# Patient Record
Sex: MALE | Race: WHITE | NOT HISPANIC OR LATINO | URBAN - METROPOLITAN AREA
[De-identification: names, ages, dates, MRNs, and addresses within clinical notes are randomized per-mention and may not be internally consistent; named-entity substitution may affect disease eponyms.]

---

## 2018-05-17 ENCOUNTER — INPATIENT (INPATIENT)
Facility: HOSPITAL | Age: 63
LOS: 0 days | Discharge: HOME | DRG: 247 | End: 2018-05-18
Attending: INTERNAL MEDICINE | Admitting: INTERNAL MEDICINE
Payer: COMMERCIAL

## 2018-05-17 VITALS
RESPIRATION RATE: 18 BRPM | WEIGHT: 227.08 LBS | HEART RATE: 78 BPM | SYSTOLIC BLOOD PRESSURE: 105 MMHG | HEIGHT: 67 IN | DIASTOLIC BLOOD PRESSURE: 64 MMHG | OXYGEN SATURATION: 96 % | TEMPERATURE: 98 F

## 2018-05-17 DIAGNOSIS — R93.1 ABNORMAL FINDINGS ON DIAGNOSTIC IMAGING OF HEART AND CORONARY CIRCULATION: ICD-10-CM

## 2018-05-17 DIAGNOSIS — Z98.890 OTHER SPECIFIED POSTPROCEDURAL STATES: Chronic | ICD-10-CM

## 2018-05-17 LAB
ALBUMIN SERPL ELPH-MCNC: 4.6 G/DL — SIGNIFICANT CHANGE UP (ref 3.3–5)
ALP SERPL-CCNC: 58 U/L — SIGNIFICANT CHANGE UP (ref 40–120)
ALT FLD-CCNC: 40 U/L — SIGNIFICANT CHANGE UP (ref 10–45)
ANION GAP SERPL CALC-SCNC: 14 MMOL/L — SIGNIFICANT CHANGE UP (ref 5–17)
AST SERPL-CCNC: 24 U/L — SIGNIFICANT CHANGE UP (ref 10–40)
BILIRUB SERPL-MCNC: 0.4 MG/DL — SIGNIFICANT CHANGE UP (ref 0.2–1.2)
BUN SERPL-MCNC: 16 MG/DL — SIGNIFICANT CHANGE UP (ref 7–23)
CALCIUM SERPL-MCNC: 9.1 MG/DL — SIGNIFICANT CHANGE UP (ref 8.4–10.5)
CHLORIDE SERPL-SCNC: 104 MMOL/L — SIGNIFICANT CHANGE UP (ref 96–108)
CO2 SERPL-SCNC: 25 MMOL/L — SIGNIFICANT CHANGE UP (ref 22–31)
CREAT SERPL-MCNC: 0.89 MG/DL — SIGNIFICANT CHANGE UP (ref 0.5–1.3)
GLUCOSE SERPL-MCNC: 95 MG/DL — SIGNIFICANT CHANGE UP (ref 70–99)
HCT VFR BLD CALC: 48.8 % — SIGNIFICANT CHANGE UP (ref 39–50)
HGB BLD-MCNC: 17.1 G/DL — HIGH (ref 13–17)
MCHC RBC-ENTMCNC: 31.2 PG — SIGNIFICANT CHANGE UP (ref 27–34)
MCHC RBC-ENTMCNC: 35.1 GM/DL — SIGNIFICANT CHANGE UP (ref 32–36)
MCV RBC AUTO: 88.9 FL — SIGNIFICANT CHANGE UP (ref 80–100)
PLATELET # BLD AUTO: 157 K/UL — SIGNIFICANT CHANGE UP (ref 150–400)
POTASSIUM SERPL-MCNC: 4.2 MMOL/L — SIGNIFICANT CHANGE UP (ref 3.5–5.3)
POTASSIUM SERPL-SCNC: 4.2 MMOL/L — SIGNIFICANT CHANGE UP (ref 3.5–5.3)
PROT SERPL-MCNC: 7.2 G/DL — SIGNIFICANT CHANGE UP (ref 6–8.3)
RBC # BLD: 5.49 M/UL — SIGNIFICANT CHANGE UP (ref 4.2–5.8)
RBC # FLD: 12.6 % — SIGNIFICANT CHANGE UP (ref 10.3–14.5)
SODIUM SERPL-SCNC: 143 MMOL/L — SIGNIFICANT CHANGE UP (ref 135–145)
WBC # BLD: 7.5 K/UL — SIGNIFICANT CHANGE UP (ref 3.8–10.5)
WBC # FLD AUTO: 7.5 K/UL — SIGNIFICANT CHANGE UP (ref 3.8–10.5)

## 2018-05-17 PROCEDURE — 99152 MOD SED SAME PHYS/QHP 5/>YRS: CPT

## 2018-05-17 PROCEDURE — 92928 PRQ TCAT PLMT NTRAC ST 1 LES: CPT | Mod: RC

## 2018-05-17 PROCEDURE — 93454 CORONARY ARTERY ANGIO S&I: CPT | Mod: 26,59

## 2018-05-17 PROCEDURE — 93010 ELECTROCARDIOGRAM REPORT: CPT | Mod: 76

## 2018-05-17 RX ORDER — ATORVASTATIN CALCIUM 80 MG/1
40 TABLET, FILM COATED ORAL AT BEDTIME
Qty: 0 | Refills: 0 | Status: DISCONTINUED | OUTPATIENT
Start: 2018-05-17 | End: 2018-05-17

## 2018-05-17 RX ORDER — CLOPIDOGREL BISULFATE 75 MG/1
75 TABLET, FILM COATED ORAL DAILY
Qty: 0 | Refills: 0 | Status: DISCONTINUED | OUTPATIENT
Start: 2018-05-18 | End: 2018-05-18

## 2018-05-17 RX ORDER — ASPIRIN/CALCIUM CARB/MAGNESIUM 324 MG
1 TABLET ORAL
Qty: 0 | Refills: 0 | COMMUNITY
Start: 2018-05-17

## 2018-05-17 RX ORDER — ATORVASTATIN CALCIUM 80 MG/1
40 TABLET, FILM COATED ORAL AT BEDTIME
Qty: 0 | Refills: 0 | Status: DISCONTINUED | OUTPATIENT
Start: 2018-05-17 | End: 2018-05-18

## 2018-05-17 RX ORDER — AMLODIPINE BESYLATE 2.5 MG/1
5 TABLET ORAL DAILY
Qty: 0 | Refills: 0 | Status: DISCONTINUED | OUTPATIENT
Start: 2018-05-17 | End: 2018-05-18

## 2018-05-17 RX ORDER — SODIUM CHLORIDE 9 MG/ML
3 INJECTION INTRAMUSCULAR; INTRAVENOUS; SUBCUTANEOUS EVERY 8 HOURS
Qty: 0 | Refills: 0 | Status: DISCONTINUED | OUTPATIENT
Start: 2018-05-17 | End: 2018-05-18

## 2018-05-17 RX ORDER — ATORVASTATIN CALCIUM 80 MG/1
1 TABLET, FILM COATED ORAL
Qty: 30 | Refills: 0 | OUTPATIENT
Start: 2018-05-17 | End: 2018-06-15

## 2018-05-17 RX ORDER — ASPIRIN/CALCIUM CARB/MAGNESIUM 324 MG
81 TABLET ORAL DAILY
Qty: 0 | Refills: 0 | Status: DISCONTINUED | OUTPATIENT
Start: 2018-05-18 | End: 2018-05-18

## 2018-05-17 RX ORDER — AMLODIPINE BESYLATE 2.5 MG/1
1 TABLET ORAL
Qty: 0 | Refills: 0 | COMMUNITY

## 2018-05-17 RX ORDER — CLOPIDOGREL BISULFATE 75 MG/1
1 TABLET, FILM COATED ORAL
Qty: 30 | Refills: 11 | OUTPATIENT
Start: 2018-05-17 | End: 2019-05-11

## 2018-05-17 RX ADMIN — ATORVASTATIN CALCIUM 40 MILLIGRAM(S): 80 TABLET, FILM COATED ORAL at 21:29

## 2018-05-17 RX ADMIN — SODIUM CHLORIDE 3 MILLILITER(S): 9 INJECTION INTRAMUSCULAR; INTRAVENOUS; SUBCUTANEOUS at 17:03

## 2018-05-17 RX ADMIN — AMLODIPINE BESYLATE 5 MILLIGRAM(S): 2.5 TABLET ORAL at 17:23

## 2018-05-17 RX ADMIN — SODIUM CHLORIDE 3 MILLILITER(S): 9 INJECTION INTRAMUSCULAR; INTRAVENOUS; SUBCUTANEOUS at 21:29

## 2018-05-17 NOTE — DISCHARGE NOTE ADULT - CARE PROVIDER_API CALL
Zev Bryson (MD), Cardiovascular Disease  [address] [facility]  3rd [place] [address]  Phone: [phone]  Fax: [phone]

## 2018-05-17 NOTE — CHART NOTE - NSCHARTNOTEFT_GEN_A_CORE
discussed benefit/risks of ASA/Plavix  patient refusing statin therapy as mother and brother had significant memory loss while on statin (unable to find way home when driving)    patient advised to follow up with private cards Dr lopez to discuss other available options. Dr emery notified

## 2018-05-17 NOTE — DISCHARGE NOTE ADULT - CARE PLAN
Principal Discharge DX:	Coronary artery disease due to lipid rich plaque  Goal:	Pt remains chest pain free and understands post cath discharge instructions  Assessment and plan of treatment:	Do not MISS ANY DOSEs or STOP you Aspirin/Plavix, because these drugs helps to keep your sten open, unless instructed to do so by your cardiologist.   No heavy lifting or pushing/pulling with procedure arm for 2 weeks. No driving for 2 days. You may shower 24 hours following the procedure but avoid baths/swimming for 1 week. Check your wrist site for bleeding and/or swelling daily following procedure and call your doctor immediately if it occurs or if you experience increased pain at the site. Follow up with your cardiologist in 1-2 weeks. You may call [facility] if you have any questions/concerns regarding your procedure [phone].   Lifestyle modification: include healthy habits to prevent stroke and future CAD  Low salt, low fat diet.   Weight management.   Take medications as prescribed.    No smoking.  Follow up with your cardiologist or primary doctor in 1- 2 weeks.  Secondary Diagnosis:	Mixed hyperlipidemia  Goal:	Your LDL cholesterol will be less than 70mg/dL  Assessment and plan of treatment:	Continue with your cholesterol medications. Eat a heart healthy diet that is low in saturated fats and salt, and includes whole grains, fruits, vegetables and lean protein; exercise regularly (consult with your physician or cardiologist first); maintain a heart healthy weight; if you smoke - quit (A resource to help you stop smoking is the [facility] for Tobacco Control – phone number [phone].). Continue to follow with your primary physician or cardiologist.  Secondary Diagnosis:	Essential hypertension  Goal:	Your blood pressure will be controlled.  Assessment and plan of treatment:	Continue with your blood pressure medications; eat a heart healthy diet with low salt diet; exercise regularly (consult with your physician or cardiologist first); maintain a heart healthy weight; if you smoke - quit (A resource to help you stop smoking is the [facility] for [employer] – phone number [phone].); include healthy ways to manage stress. Continue to follow with your primary care physician or cardiologist.

## 2018-05-17 NOTE — DISCHARGE NOTE ADULT - MEDICATION SUMMARY - MEDICATIONS TO TAKE
I will START or STAY ON the medications listed below when I get home from the hospital:    aspirin 81 mg oral delayed release tablet  -- 1 tab(s) by mouth once a day  -- Indication: For Coronary artery disease due to lipid rich plaque    atorvastatin 40 mg oral tablet  -- 1 tab(s) by mouth once a day (at bedtime) MDD:1  -- Indication: For Mixed hyperlipidemia    clopidogrel 75 mg oral tablet  -- 1 tab(s) by mouth once a day MDD:1  -- Indication: For Coronary artery disease due to lipid rich plaque    amLODIPine 5 mg oral tablet  -- 1 tab(s) by mouth once a day  -- Indication: For HTN (hypertension)

## 2018-05-17 NOTE — CHART NOTE - NSCHARTNOTEFT_GEN_A_CORE
Patient underwent a PCI procedure and is being admitted as they are at increased risk for major adverse cardiac and vascular events if discharged due to the following high risk characteristics:  Uncontrolled HTN

## 2018-05-17 NOTE — H&P CARDIOLOGY - FAMILY HISTORY
Father  Still living? No  Family history of brain aneurysm, Age at diagnosis: Age Unknown     Mother  Still living? No  Family history of Alzheimer's disease, Age at diagnosis: Age Unknown

## 2018-05-17 NOTE — DISCHARGE NOTE ADULT - PATIENT PORTAL LINK FT
You can access the [facility] Portal, offered by [facility], by registering with the following website: [email or web address]

## 2018-05-17 NOTE — H&P CARDIOLOGY - HISTORY OF PRESENT ILLNESS
62  male pt with PMHx of HTN presents for cardiac cath. Pt reports she has been  feeling tired, evaluated by Dr. Mcgraw, had abnormal stress echogram today then sent for cath. pt denies chest pain or SOB. 62  male pt with PMHx of HTN presents for cardiac cath. Pt reports she has been  feeling tired, evaluated by Dr. Mcgraw, had abnormal stress echocardiogram today then sent for cath. pt denies chest pain or SOB. 62  male pt with PMHx of HTN presents for cardiac cath. Pt reports she has been  feeling tired, evaluated by Dr. Mcgraw, had abnormal stress echocardiogram today then sent for cath. pt denies chest pain or SOB.       Test results not available.

## 2018-05-17 NOTE — DISCHARGE NOTE ADULT - PLAN OF CARE
Pt remains chest pain free and understands post cath discharge instructions Do not MISS ANY DOSEs or STOP you Aspirin/Plavix, because these drugs helps to keep your sten open, unless instructed to do so by your cardiologist.   No heavy lifting or pushing/pulling with procedure arm for 2 weeks. No driving for 2 days. You may shower 24 hours following the procedure but avoid baths/swimming for 1 week. Check your wrist site for bleeding and/or swelling daily following procedure and call your doctor immediately if it occurs or if you experience increased pain at the site. Follow up with your cardiologist in 1-2 weeks. You may call [facility] if you have any questions/concerns regarding your procedure [phone].   Lifestyle modification: include healthy habits to prevent stroke and future CAD  Low salt, low fat diet.   Weight management.   Take medications as prescribed.    No smoking.  Follow up with your cardiologist or primary doctor in 1- 2 weeks. Your LDL cholesterol will be less than 70mg/dL Continue with your cholesterol medications. Eat a heart healthy diet that is low in saturated fats and salt, and includes whole grains, fruits, vegetables and lean protein; exercise regularly (consult with your physician or cardiologist first); maintain a heart healthy weight; if you smoke - quit (A resource to help you stop smoking is the [facility] for Tobacco Control – phone number [phone].). Continue to follow with your primary physician or cardiologist. Your blood pressure will be controlled. Continue with your blood pressure medications; eat a heart healthy diet with low salt diet; exercise regularly (consult with your physician or cardiologist first); maintain a heart healthy weight; if you smoke - quit (A resource to help you stop smoking is the [facility] for Tobacco Control – phone number [phone].); include healthy ways to manage stress. Continue to follow with your primary care physician or cardiologist.

## 2018-05-17 NOTE — CHART NOTE - NSCHARTNOTEFT_GEN_A_CORE
s/p PCI of RCA- with HERMILO x 1       patient tolerated procedure well.  No chest pain or dyspnea reported     O/E a+O x 4  Pulm CTA jose  CV S1 S2 RRR   Tele SR 70s  RRA site benign- no hematoma no bleeding +cap refill intact    post procedure EKG shows new Q waves 11, aVF (present in 111 prior to cath)  Poor R wave progression  new T wave inversion 1, 11, aVL ( TWI in 111, aVf biphasic, V5-V6 pre cath)    EKGs reviewed with Dr Ybarra  no intervention necessary at this time as patient is pain free  please continue to monitor closely - signed out to night NP

## 2018-05-17 NOTE — H&P CARDIOLOGY - EKG AND INTERPRETATION
SR @ 80T wave abnormalities in II, III, aVF V 4-6, Q in V 1-2, left axis deviation. SR @ 80, T wave abnormalities in II, III, aVF V 4-6, Q in V 1-2, left axis deviation.

## 2018-05-17 NOTE — DISCHARGE NOTE ADULT - HOSPITAL COURSE
62  male pt with PMHx of HTN presents for cardiac cath. Pt reports she has been  feeling tired, evaluated by Dr. Mcgraw, had abnormal stress echocardiogram today then sent for cath. pt denies chest pain or SOB.   Pt s/p PCI: HERMILO x1 to RCA via R radial. Pt tolerated procedure well and overnight remained uneventful. No c/o chest pain or SOB. Pt is hemodynamically stable, EKG and all lab results reviewed. Insertion/incision site benign, no bleeding or hematoma, and cath site dressing changed. Discharge teaching provided to Pt/caretaker and verbalized understanding the instruction. Pt is stable for discharge home as per attending.

## 2018-05-18 VITALS
RESPIRATION RATE: 17 BRPM | HEART RATE: 72 BPM | SYSTOLIC BLOOD PRESSURE: 132 MMHG | OXYGEN SATURATION: 95 % | DIASTOLIC BLOOD PRESSURE: 87 MMHG | TEMPERATURE: 98 F

## 2018-05-18 LAB
ANION GAP SERPL CALC-SCNC: 13 MMOL/L — SIGNIFICANT CHANGE UP (ref 5–17)
BUN SERPL-MCNC: 18 MG/DL — SIGNIFICANT CHANGE UP (ref 7–23)
CALCIUM SERPL-MCNC: 9 MG/DL — SIGNIFICANT CHANGE UP (ref 8.4–10.5)
CHLORIDE SERPL-SCNC: 103 MMOL/L — SIGNIFICANT CHANGE UP (ref 96–108)
CHOLEST SERPL-MCNC: 177 MG/DL — SIGNIFICANT CHANGE UP (ref 10–199)
CO2 SERPL-SCNC: 26 MMOL/L — SIGNIFICANT CHANGE UP (ref 22–31)
CREAT SERPL-MCNC: 1.01 MG/DL — SIGNIFICANT CHANGE UP (ref 0.5–1.3)
GLUCOSE SERPL-MCNC: 130 MG/DL — HIGH (ref 70–99)
HCT VFR BLD CALC: 47.7 % — SIGNIFICANT CHANGE UP (ref 39–50)
HDLC SERPL-MCNC: 20 MG/DL — LOW (ref 40–125)
HGB BLD-MCNC: 16.4 G/DL — SIGNIFICANT CHANGE UP (ref 13–17)
LIPID PNL WITH DIRECT LDL SERPL: 90 MG/DL — SIGNIFICANT CHANGE UP
MCHC RBC-ENTMCNC: 30.6 PG — SIGNIFICANT CHANGE UP (ref 27–34)
MCHC RBC-ENTMCNC: 34.4 GM/DL — SIGNIFICANT CHANGE UP (ref 32–36)
MCV RBC AUTO: 88.9 FL — SIGNIFICANT CHANGE UP (ref 80–100)
PLATELET # BLD AUTO: 155 K/UL — SIGNIFICANT CHANGE UP (ref 150–400)
POTASSIUM SERPL-MCNC: 3.9 MMOL/L — SIGNIFICANT CHANGE UP (ref 3.5–5.3)
POTASSIUM SERPL-SCNC: 3.9 MMOL/L — SIGNIFICANT CHANGE UP (ref 3.5–5.3)
RBC # BLD: 5.37 M/UL — SIGNIFICANT CHANGE UP (ref 4.2–5.8)
RBC # FLD: 12.5 % — SIGNIFICANT CHANGE UP (ref 10.3–14.5)
SODIUM SERPL-SCNC: 142 MMOL/L — SIGNIFICANT CHANGE UP (ref 135–145)
TOTAL CHOLESTEROL/HDL RATIO MEASUREMENT: 8.9 RATIO — SIGNIFICANT CHANGE UP (ref 3.4–9.6)
TRIGL SERPL-MCNC: 336 MG/DL — HIGH (ref 10–149)
WBC # BLD: 7.3 K/UL — SIGNIFICANT CHANGE UP (ref 3.8–10.5)
WBC # FLD AUTO: 7.3 K/UL — SIGNIFICANT CHANGE UP (ref 3.8–10.5)

## 2018-05-18 PROCEDURE — 99153 MOD SED SAME PHYS/QHP EA: CPT

## 2018-05-18 PROCEDURE — 93454 CORONARY ARTERY ANGIO S&I: CPT | Mod: 59

## 2018-05-18 PROCEDURE — 80061 LIPID PANEL: CPT

## 2018-05-18 PROCEDURE — 99152 MOD SED SAME PHYS/QHP 5/>YRS: CPT

## 2018-05-18 PROCEDURE — C1894: CPT

## 2018-05-18 PROCEDURE — C1887: CPT

## 2018-05-18 PROCEDURE — C1769: CPT

## 2018-05-18 PROCEDURE — 80048 BASIC METABOLIC PNL TOTAL CA: CPT

## 2018-05-18 PROCEDURE — 93010 ELECTROCARDIOGRAM REPORT: CPT

## 2018-05-18 PROCEDURE — C9600: CPT | Mod: RC

## 2018-05-18 PROCEDURE — 80053 COMPREHEN METABOLIC PANEL: CPT

## 2018-05-18 PROCEDURE — 93005 ELECTROCARDIOGRAM TRACING: CPT

## 2018-05-18 PROCEDURE — 85027 COMPLETE CBC AUTOMATED: CPT

## 2018-05-18 PROCEDURE — C1874: CPT

## 2018-05-18 RX ADMIN — CLOPIDOGREL BISULFATE 75 MILLIGRAM(S): 75 TABLET, FILM COATED ORAL at 05:21

## 2018-05-18 RX ADMIN — SODIUM CHLORIDE 3 MILLILITER(S): 9 INJECTION INTRAMUSCULAR; INTRAVENOUS; SUBCUTANEOUS at 05:19

## 2018-05-18 RX ADMIN — Medication 81 MILLIGRAM(S): at 05:21

## 2018-05-18 RX ADMIN — AMLODIPINE BESYLATE 5 MILLIGRAM(S): 2.5 TABLET ORAL at 05:21

## 2018-05-18 NOTE — PROGRESS NOTE ADULT - SUBJECTIVE AND OBJECTIVE BOX
Subjective/Objective:  Patient is a 62y old  Male who presents with a chief complaint of cardiac cath (17 May 2018 20:16)    Allergies    No Known Allergies    Intolerances      Medications:  amLODIPine   Tablet 5 milliGRAM(s) Oral daily  aspirin enteric coated 81 milliGRAM(s) Oral daily  atorvastatin 40 milliGRAM(s) Oral at bedtime  clopidogrel Tablet 75 milliGRAM(s) Oral daily  sodium chloride 0.9% lock flush 3 milliLiter(s) IV Push every 8 hours      Review of Systems:   No c/o chest pain or SOB, and all others negative.    Vitals:  T(C): 36.7 (18 @ 20:50), Max: 36.7 (18 @ 13:26)  HR: 76 (18 @ 20:50) (68 - 81)  BP: 149/91 (18 @ 20:50) (105/64 - 160/89)  BP(mean): 77 (18 @ 13:26) (77 - 77)  RR: 16 (18 @ 20:50) (16 - 18)  SpO2: 94% (18 @ 20:50) (93% - 96%)  Wt(kg): --  Daily Height in cm: 170.18 (17 May 2018 15:50)    Daily Weight in k (17 May 2018 13:26)  I&O's Summary    17 May 2018 07:01  -  18 May 2018 05:01  --------------------------------------------------------  IN: 480 mL / OUT: 0 mL / NET: 480 mL      Physical Exam:  Appearance: Pt in NAD, non-toxic  Cardiovascular: S1 S2  Cath Site: No evidence of bleeding or hematoma, Non-tender to palpation, 2+ distal pulses  Respiratory: Clear to auscultation bilaterally  Gastrointestinal: Soft, NT/ND, Bowel Sounds +  Neurologic: Non-focal                          16.4   7.3   )-----------( 155      ( 18 May 2018 00:04 )             47.7     18    142  |  103  |  18  ----------------------------<  130<H>  3.9   |  26  |  1.01    Ca    9.0      18 May 2018 00:04    TPro  7.2  /  Alb  4.6  /  TBili  0.4  /  DBili  x   /  AST  24  /  ALT  40  /  AlkPhos  58  05-17            Lipid panel Total Cholesterol: 177  LDL: 90  HDL: 20  T      Hgb A1c       Interpretation of Telemetry: SR at HR 60-80's.  No special event over night.    Assessment/Plan:   S/P PCI: HERMILO x1 to RCA via R radial. Pt tolerated procedure well and overnight remained uneventful. No c/o chest pain or SOB. Pt is hemodynamically stable, EKG and all lab results reviewed, unremarkable. Insertion/incision site benign, no bleeding or hematoma, and cath site dressing changed. Discharge teaching provided to Pt/caretaker and verbalized understanding the instruction. Pt is stable for discharge home as per attending.   F/U with cardiologist in 1-2 weeks.  Plan to d/c home in Am if stable.

## 2023-09-19 NOTE — PATIENT PROFILE ADULT. - NS PRO CONTRA FLU 1
out of season (available sept 1 thru apr 2 only)

## 2024-07-02 PROBLEM — I10 ESSENTIAL (PRIMARY) HYPERTENSION: Chronic | Status: ACTIVE | Noted: 2018-05-17

## 2024-07-03 ENCOUNTER — OUTPATIENT (OUTPATIENT)
Dept: OUTPATIENT SERVICES | Facility: HOSPITAL | Age: 69
LOS: 1 days | End: 2024-07-03
Payer: MEDICARE

## 2024-07-03 VITALS
RESPIRATION RATE: 17 BRPM | SYSTOLIC BLOOD PRESSURE: 134 MMHG | DIASTOLIC BLOOD PRESSURE: 74 MMHG | TEMPERATURE: 98 F | HEIGHT: 66 IN | WEIGHT: 240.08 LBS | OXYGEN SATURATION: 95 % | HEART RATE: 65 BPM

## 2024-07-03 VITALS
HEART RATE: 70 BPM | DIASTOLIC BLOOD PRESSURE: 67 MMHG | RESPIRATION RATE: 17 BRPM | OXYGEN SATURATION: 95 % | SYSTOLIC BLOOD PRESSURE: 122 MMHG

## 2024-07-03 DIAGNOSIS — Z95.5 PRESENCE OF CORONARY ANGIOPLASTY IMPLANT AND GRAFT: Chronic | ICD-10-CM

## 2024-07-03 DIAGNOSIS — R94.39 ABNORMAL RESULT OF OTHER CARDIOVASCULAR FUNCTION STUDY: ICD-10-CM

## 2024-07-03 DIAGNOSIS — Z98.890 OTHER SPECIFIED POSTPROCEDURAL STATES: Chronic | ICD-10-CM

## 2024-07-03 LAB
ANION GAP SERPL CALC-SCNC: 13 MMOL/L — SIGNIFICANT CHANGE UP (ref 5–17)
BUN SERPL-MCNC: 18 MG/DL — SIGNIFICANT CHANGE UP (ref 7–23)
CALCIUM SERPL-MCNC: 9.9 MG/DL — SIGNIFICANT CHANGE UP (ref 8.4–10.5)
CHLORIDE SERPL-SCNC: 106 MMOL/L — SIGNIFICANT CHANGE UP (ref 96–108)
CO2 SERPL-SCNC: 22 MMOL/L — SIGNIFICANT CHANGE UP (ref 22–31)
CREAT SERPL-MCNC: 0.76 MG/DL — SIGNIFICANT CHANGE UP (ref 0.5–1.3)
EGFR: 97 ML/MIN/1.73M2 — SIGNIFICANT CHANGE UP
GLUCOSE BLDC GLUCOMTR-MCNC: 112 MG/DL — HIGH (ref 70–99)
GLUCOSE SERPL-MCNC: 111 MG/DL — HIGH (ref 70–99)
HCT VFR BLD CALC: 43.4 % — SIGNIFICANT CHANGE UP (ref 39–50)
HGB BLD-MCNC: 14.6 G/DL — SIGNIFICANT CHANGE UP (ref 13–17)
MAGNESIUM SERPL-MCNC: 2.4 MG/DL — SIGNIFICANT CHANGE UP (ref 1.6–2.6)
MCHC RBC-ENTMCNC: 29.2 PG — SIGNIFICANT CHANGE UP (ref 27–34)
MCHC RBC-ENTMCNC: 33.6 GM/DL — SIGNIFICANT CHANGE UP (ref 32–36)
MCV RBC AUTO: 86.8 FL — SIGNIFICANT CHANGE UP (ref 80–100)
NRBC # BLD: 0 /100 WBCS — SIGNIFICANT CHANGE UP (ref 0–0)
PLATELET # BLD AUTO: 222 K/UL — SIGNIFICANT CHANGE UP (ref 150–400)
POTASSIUM SERPL-MCNC: 4.2 MMOL/L — SIGNIFICANT CHANGE UP (ref 3.5–5.3)
POTASSIUM SERPL-SCNC: 4.2 MMOL/L — SIGNIFICANT CHANGE UP (ref 3.5–5.3)
RBC # BLD: 5 M/UL — SIGNIFICANT CHANGE UP (ref 4.2–5.8)
RBC # FLD: 13.5 % — SIGNIFICANT CHANGE UP (ref 10.3–14.5)
SODIUM SERPL-SCNC: 141 MMOL/L — SIGNIFICANT CHANGE UP (ref 135–145)
WBC # BLD: 8.02 K/UL — SIGNIFICANT CHANGE UP (ref 3.8–10.5)
WBC # FLD AUTO: 8.02 K/UL — SIGNIFICANT CHANGE UP (ref 3.8–10.5)

## 2024-07-03 PROCEDURE — 93005 ELECTROCARDIOGRAM TRACING: CPT

## 2024-07-03 PROCEDURE — 85027 COMPLETE CBC AUTOMATED: CPT

## 2024-07-03 PROCEDURE — C1769: CPT

## 2024-07-03 PROCEDURE — 99261: CPT

## 2024-07-03 PROCEDURE — 82962 GLUCOSE BLOOD TEST: CPT

## 2024-07-03 PROCEDURE — C1887: CPT

## 2024-07-03 PROCEDURE — 93010 ELECTROCARDIOGRAM REPORT: CPT

## 2024-07-03 PROCEDURE — 93458 L HRT ARTERY/VENTRICLE ANGIO: CPT

## 2024-07-03 PROCEDURE — 80048 BASIC METABOLIC PNL TOTAL CA: CPT

## 2024-07-03 PROCEDURE — 83735 ASSAY OF MAGNESIUM: CPT

## 2024-07-03 PROCEDURE — C1894: CPT

## 2024-07-03 PROCEDURE — 36415 COLL VENOUS BLD VENIPUNCTURE: CPT

## 2024-07-03 RX ORDER — ISOSORBIDE MONONITRATE 30 MG/1
30 TABLET, EXTENDED RELEASE ORAL DAILY
Refills: 0 | Status: ACTIVE | OUTPATIENT
Start: 2024-07-03 | End: 2025-06-01

## 2024-07-03 RX ORDER — METFORMIN HYDROCHLORIDE 850 MG/1
2 TABLET, FILM COATED ORAL
Qty: 0 | Refills: 0 | DISCHARGE

## 2024-07-03 RX ORDER — CARVEDILOL PHOSPHATE 80 MG/1
1 CAPSULE, EXTENDED RELEASE ORAL
Refills: 0 | DISCHARGE

## 2024-07-03 RX ORDER — ATORVASTATIN CALCIUM 20 MG/1
1 TABLET, FILM COATED ORAL
Refills: 0 | DISCHARGE

## 2024-07-03 RX ORDER — SODIUM CHLORIDE 0.9 % (FLUSH) 0.9 %
1000 SYRINGE (ML) INJECTION
Refills: 0 | Status: ACTIVE | OUTPATIENT
Start: 2024-07-03 | End: 2024-07-03

## 2024-07-03 RX ORDER — TIMOLOL MALEATE 3.4 MG/ML
1 SOLUTION/ DROPS OPHTHALMIC
Refills: 0 | DISCHARGE

## 2024-07-03 RX ORDER — ISOSORBIDE MONONITRATE 30 MG/1
1 TABLET, EXTENDED RELEASE ORAL
Qty: 30 | Refills: 3
Start: 2024-07-03 | End: 2024-10-30

## 2024-07-03 RX ORDER — SODIUM CHLORIDE 0.9 % (FLUSH) 0.9 %
250 SYRINGE (ML) INJECTION ONCE
Refills: 0 | Status: COMPLETED | OUTPATIENT
Start: 2024-07-03 | End: 2024-07-03

## 2024-07-03 RX ORDER — LISINOPRIL 5 MG/1
1 TABLET ORAL
Refills: 0 | DISCHARGE

## 2024-07-03 RX ADMIN — ISOSORBIDE MONONITRATE 30 MILLIGRAM(S): 30 TABLET, EXTENDED RELEASE ORAL at 15:14

## 2024-11-26 NOTE — PATIENT PROFILE ADULT. - BRADEN SCORE (IF 18 OR LESS ACTIVATE SKIN INJURY RISK INCREASED GUIDELINE), MLM
Quality 130: Documentation Of Current Medications In The Medical Record: Current Medications Documented Detail Level: Detailed Quality 431: Preventive Care And Screening: Unhealthy Alcohol Use - Screening: Patient not identified as an unhealthy alcohol user when screened for unhealthy alcohol use using a systematic screening method Quality 226: Preventive Care And Screening: Tobacco Use: Screening And Cessation Intervention: Patient screened for tobacco use and is an ex/non-smoker 21